# Patient Record
Sex: MALE | Race: WHITE | NOT HISPANIC OR LATINO
[De-identification: names, ages, dates, MRNs, and addresses within clinical notes are randomized per-mention and may not be internally consistent; named-entity substitution may affect disease eponyms.]

---

## 2022-05-04 PROBLEM — Z00.00 ENCOUNTER FOR PREVENTIVE HEALTH EXAMINATION: Status: ACTIVE | Noted: 2022-05-04

## 2022-05-06 ENCOUNTER — APPOINTMENT (OUTPATIENT)
Dept: UROLOGY | Facility: CLINIC | Age: 52
End: 2022-05-06
Payer: COMMERCIAL

## 2022-05-06 DIAGNOSIS — Z80.3 FAMILY HISTORY OF MALIGNANT NEOPLASM OF BREAST: ICD-10-CM

## 2022-05-06 DIAGNOSIS — Z78.9 OTHER SPECIFIED HEALTH STATUS: ICD-10-CM

## 2022-05-06 DIAGNOSIS — Z87.11 PERSONAL HISTORY OF PEPTIC ULCER DISEASE: ICD-10-CM

## 2022-05-06 DIAGNOSIS — E66.9 OBESITY, UNSPECIFIED: ICD-10-CM

## 2022-05-06 DIAGNOSIS — Z87.891 PERSONAL HISTORY OF NICOTINE DEPENDENCE: ICD-10-CM

## 2022-05-06 DIAGNOSIS — E11.9 TYPE 2 DIABETES MELLITUS W/OUT COMPLICATIONS: ICD-10-CM

## 2022-05-06 DIAGNOSIS — Z87.438 PERSONAL HISTORY OF OTHER DISEASES OF MALE GENITAL ORGANS: ICD-10-CM

## 2022-05-06 DIAGNOSIS — E78.5 HYPERLIPIDEMIA, UNSPECIFIED: ICD-10-CM

## 2022-05-06 DIAGNOSIS — Z80.0 FAMILY HISTORY OF MALIGNANT NEOPLASM OF DIGESTIVE ORGANS: ICD-10-CM

## 2022-05-06 DIAGNOSIS — R07.89 OTHER CHEST PAIN: ICD-10-CM

## 2022-05-06 DIAGNOSIS — Z80.7 FAMILY HISTORY OF OTHER MALIGNANT NEOPLASMS OF LYMPHOID, HEMATOPOIETIC AND RELATED TISSUES: ICD-10-CM

## 2022-05-06 LAB
BILIRUB UR QL STRIP: NEGATIVE
CLARITY UR: CLEAR
GLUCOSE UR-MCNC: 500
HCG UR QL: 0.2 EU/DL
HGB UR QL STRIP.AUTO: NEGATIVE
KETONES UR-MCNC: NEGATIVE
LEUKOCYTE ESTERASE UR QL STRIP: NEGATIVE
NITRITE UR QL STRIP: NEGATIVE
PH UR STRIP: 5
PROT UR STRIP-MCNC: NEGATIVE
SP GR UR STRIP: 1.01

## 2022-05-06 PROCEDURE — 81003 URINALYSIS AUTO W/O SCOPE: CPT | Mod: QW

## 2022-05-06 PROCEDURE — 99205 OFFICE O/P NEW HI 60 MIN: CPT | Mod: 25

## 2022-05-06 PROCEDURE — 76857 US EXAM PELVIC LIMITED: CPT

## 2022-05-06 RX ORDER — LOSARTAN POTASSIUM 100 MG/1
TABLET, FILM COATED ORAL
Refills: 0 | Status: ACTIVE | COMMUNITY

## 2022-05-06 RX ORDER — ATORVASTATIN CALCIUM 80 MG/1
TABLET, FILM COATED ORAL
Refills: 0 | Status: ACTIVE | COMMUNITY

## 2022-05-06 RX ORDER — DAPAGLIFLOZIN 10 MG/1
TABLET, FILM COATED ORAL
Refills: 0 | Status: ACTIVE | COMMUNITY

## 2022-05-06 RX ORDER — METFORMIN HYDROCHLORIDE 625 MG/1
TABLET ORAL
Refills: 0 | Status: ACTIVE | COMMUNITY

## 2022-05-06 RX ORDER — ASPIRIN 81 MG
81 TABLET, DELAYED RELEASE (ENTERIC COATED) ORAL
Refills: 0 | Status: ACTIVE | COMMUNITY

## 2022-05-06 NOTE — LETTER BODY
[Dear  ___] : Dear  [unfilled], [Consult Letter:] : I had the pleasure of evaluating your patient, [unfilled]. [Please see my note below.] : Please see my note below. [Consult Closing:] : Thank you very much for allowing me to participate in the care of this patient.  If you have any questions, please do not hesitate to contact me. [Sincerely,] : Sincerely, [FreeTextEntry3] : Ovidio Bailey MD, FACS

## 2022-05-06 NOTE — HISTORY OF PRESENT ILLNESS
[FreeTextEntry1] : Mr. CALEB LITTLEJOHN comes in today for a urologic evaluation.  He presents with 3-4 week onset of left flank pain. This is described as localized, persistent with some variation in intensity up to "5/10".  He denies hematuria or fever.   He had an episode of renal colic one year ago prompting an ER visit which confirmed right ureterolithiasis ("4mm"); the pain resolved although he did not retrieve the stone. \par \par From his general urologic history Mr. Littlejohn reports moderate lower urinary tract symptoms with nocturia x1.\par IPSS: 5/35\par Sono (performed to assess bladder emptying): 21cc PVR\par \par Mr. Littlejohn has been having difficulty achieving erections for several years.  He tried Viagra (? dose) without minimal improvement. Cialis caused a significant headache. \par \par

## 2022-05-06 NOTE — ASSESSMENT
[FreeTextEntry1] : I discussed the findings and options with Mr. CALEB LITTLEJOHN in detail. The location of the pain suggests it is probably musculoskeletal and less likely renal colic.  I have ordered a stone protocol CT to confirm this, especially in view of his prior history of urolithiasis.  I also asked Mr. Littlejohn to send us his prior CT scan for our review.\par \par No intervention is warranted for his minimal voiding symptoms.\par \par Regarding the ED, Mr. Littlejohn will retry Viagra 100-150mg and I reviewed its proper use and counselled him regarding the side effects.  If this is not effective, remaining options include penile injection therapy and a penile prosthesis.  Importantly, he needs to achieve better glucose control, lose weight and begin exercising.\par \par He will consider PSA testing with Dr. Alexander.  The current UA guidelines recommend this at the age of 57. \par \par

## 2022-05-06 NOTE — ADDENDUM
[FreeTextEntry1] : A portion of this note was written by [Pio Barton] on 05/05/2022 acting as a scribe for Dr. Bailey. \par \par I have personally reviewed the chart and agree that the record accurately reflects my personal performance of the history, physical exam, assessment, and plan.

## 2022-05-06 NOTE — PHYSICAL EXAM
[General Appearance - Well Developed] : well developed [General Appearance - Well Nourished] : well nourished [Normal Appearance] : normal appearance [Well Groomed] : well groomed [General Appearance - In No Acute Distress] : no acute distress [Edema] : no peripheral edema [Respiration, Rhythm And Depth] : normal respiratory rhythm and effort [Exaggerated Use Of Accessory Muscles For Inspiration] : no accessory muscle use [Abdomen Soft] : soft [Abdomen Tenderness] : non-tender [Costovertebral Angle Tenderness] : no ~M costovertebral angle tenderness [Urethral Meatus] : meatus normal [Urinary Bladder Findings] : the bladder was normal on palpation [Scrotum] : the scrotum was normal [Testes Mass (___cm)] : there were no testicular masses [No Prostate Nodules] : no prostate nodules [Normal Station and Gait] : the gait and station were normal for the patient's age [] : no rash [No Focal Deficits] : no focal deficits [Oriented To Time, Place, And Person] : oriented to person, place, and time [Affect] : the affect was normal [Mood] : the mood was normal [Not Anxious] : not anxious [No Palpable Adenopathy] : no palpable adenopathy [Heart Rate And Rhythm] : Heart rate and rhythm were normal [Abdomen Mass (___ Cm)] : no abdominal mass palpated [Abdomen Hernia] : no hernia was discovered [Penis Abnormality] : normal circumcised penis [Epididymis] : the epididymides were normal [Testes Tenderness] : no tenderness of the testes [Prostate Tenderness] : the prostate was not tender [Skin Color & Pigmentation] : normal skin color and pigmentation [FreeTextEntry1] : External hemorrhoids

## 2022-05-20 ENCOUNTER — NON-APPOINTMENT (OUTPATIENT)
Age: 52
End: 2022-05-20

## 2023-08-03 ENCOUNTER — OFFICE (OUTPATIENT)
Dept: URBAN - METROPOLITAN AREA CLINIC 29 | Facility: CLINIC | Age: 53
Setting detail: OPHTHALMOLOGY
End: 2023-08-03
Payer: COMMERCIAL

## 2023-08-03 DIAGNOSIS — H52.4: ICD-10-CM

## 2023-08-03 DIAGNOSIS — E11.9: ICD-10-CM

## 2023-08-03 DIAGNOSIS — H43.813: ICD-10-CM

## 2023-08-03 DIAGNOSIS — H35.463: ICD-10-CM

## 2023-08-03 PROCEDURE — 92015 DETERMINE REFRACTIVE STATE: CPT | Performed by: OPHTHALMOLOGY

## 2023-08-03 PROCEDURE — 92201 OPSCPY EXTND RTA DRAW UNI/BI: CPT | Performed by: OPHTHALMOLOGY

## 2023-08-03 PROCEDURE — 92014 COMPRE OPH EXAM EST PT 1/>: CPT | Performed by: OPHTHALMOLOGY

## 2023-08-03 ASSESSMENT — SPHEQUIV_DERIVED
OD_SPHEQUIV: -3.25
OS_SPHEQUIV: -4.375
OD_SPHEQUIV: -3.25

## 2023-08-03 ASSESSMENT — CONFRONTATIONAL VISUAL FIELD TEST (CVF)
OS_FINDINGS: FULL
OD_FINDINGS: FULL

## 2023-08-03 ASSESSMENT — REFRACTION_CURRENTRX
OD_SPHERE: -3.50
OD_OVR_VA: 20/
OD_CYLINDER: SPHERE
OS_CYLINDER: SPHERE
OD_OVR_VA: 20/
OS_ADD: +2.00
OD_ADD: +1.75
OS_OVR_VA: 20/
OD_AXIS: 90
OD_ADD: +2.00
OS_SPHERE: -4.75
OS_ADD: +1.75
OS_OVR_VA: 20/

## 2023-08-03 ASSESSMENT — REFRACTION_AUTOREFRACTION
OD_AXIS: 65
OD_CYLINDER: +1.00
OS_SPHERE: -4.50
OS_CYLINDER: +0.25
OS_AXIS: 150
OD_SPHERE: -3.75

## 2023-08-03 ASSESSMENT — REFRACTION_MANIFEST
OS_ADD: +2.00
OS_VA1: 20/20
OD_SPHERE: -3.75
OS_CYLINDER: SPH
OD_VA1: 20/20
OS_SPHERE: -4.50
OD_CYLINDER: +1.00
OD_ADD: +2.00
OD_AXIS: 65

## 2023-08-03 ASSESSMENT — TONOMETRY
OS_IOP_MMHG: 14
OD_IOP_MMHG: 14

## 2023-08-03 ASSESSMENT — VISUAL ACUITY
OS_BCVA: 20/25-2
OD_BCVA: 20/20-2

## 2023-09-19 ENCOUNTER — APPOINTMENT (OUTPATIENT)
Dept: UROLOGY | Facility: CLINIC | Age: 53
End: 2023-09-19
Payer: COMMERCIAL

## 2023-10-05 PROBLEM — R39.9 LOWER URINARY TRACT SYMPTOMS: Status: ACTIVE | Noted: 2022-05-06

## 2023-10-05 PROBLEM — N52.01 ERECTILE DYSFUNCTION DUE TO ARTERIAL INSUFFICIENCY: Status: ACTIVE | Noted: 2022-05-06

## 2023-10-05 PROBLEM — Z80.52 FAMILY HISTORY OF BLADDER CANCER: Status: ACTIVE | Noted: 2022-05-06

## 2023-10-05 PROBLEM — N20.9 UROLITHIASIS: Status: ACTIVE | Noted: 2022-05-06

## 2023-10-06 ENCOUNTER — APPOINTMENT (OUTPATIENT)
Dept: UROLOGY | Facility: CLINIC | Age: 53
End: 2023-10-06
Payer: COMMERCIAL

## 2023-10-06 VITALS — DIASTOLIC BLOOD PRESSURE: 78 MMHG | OXYGEN SATURATION: 96 % | HEART RATE: 82 BPM | SYSTOLIC BLOOD PRESSURE: 112 MMHG

## 2023-10-06 DIAGNOSIS — R39.9 UNSPECIFIED SYMPTOMS AND SIGNS INVOLVING THE GENITOURINARY SYSTEM: ICD-10-CM

## 2023-10-06 DIAGNOSIS — N52.01 ERECTILE DYSFUNCTION DUE TO ARTERIAL INSUFFICIENCY: ICD-10-CM

## 2023-10-06 DIAGNOSIS — Z80.52 FAMILY HISTORY OF MALIGNANT NEOPLASM OF BLADDER: ICD-10-CM

## 2023-10-06 DIAGNOSIS — N20.9 URINARY CALCULUS, UNSPECIFIED: ICD-10-CM

## 2023-10-06 PROCEDURE — 51798 US URINE CAPACITY MEASURE: CPT

## 2023-10-06 PROCEDURE — 99215 OFFICE O/P EST HI 40 MIN: CPT

## 2023-10-06 PROCEDURE — 76775 US EXAM ABDO BACK WALL LIM: CPT

## 2023-10-06 RX ORDER — TADALAFIL 20 MG/1
20 TABLET ORAL
Qty: 10 | Refills: 11 | Status: ACTIVE | COMMUNITY
Start: 2022-05-06 | End: 1900-01-01

## 2023-10-06 RX ORDER — SILDENAFIL 100 MG/1
100 TABLET, FILM COATED ORAL
Qty: 10 | Refills: 11 | Status: ACTIVE | COMMUNITY
Start: 2022-05-06 | End: 1900-01-01

## 2023-10-08 LAB — PSA SERPL-MCNC: 1.3 NG/ML

## 2023-10-09 ENCOUNTER — NON-APPOINTMENT (OUTPATIENT)
Age: 53
End: 2023-10-09

## 2024-09-16 ENCOUNTER — OFFICE (OUTPATIENT)
Facility: LOCATION | Age: 54
Setting detail: OPHTHALMOLOGY
End: 2024-09-16
Payer: COMMERCIAL

## 2024-09-16 DIAGNOSIS — H35.463: ICD-10-CM

## 2024-09-16 DIAGNOSIS — H43.813: ICD-10-CM

## 2024-09-16 DIAGNOSIS — H52.4: ICD-10-CM

## 2024-09-16 DIAGNOSIS — E11.9: ICD-10-CM

## 2024-09-16 PROCEDURE — 92014 COMPRE OPH EXAM EST PT 1/>: CPT | Performed by: OPHTHALMOLOGY

## 2024-09-16 PROCEDURE — 92015 DETERMINE REFRACTIVE STATE: CPT | Performed by: OPHTHALMOLOGY

## 2024-09-16 PROCEDURE — 92201 OPSCPY EXTND RTA DRAW UNI/BI: CPT | Performed by: OPHTHALMOLOGY

## 2024-09-16 ASSESSMENT — CONFRONTATIONAL VISUAL FIELD TEST (CVF)
OD_FINDINGS: FULL
OS_FINDINGS: FULL

## 2024-10-04 ENCOUNTER — APPOINTMENT (OUTPATIENT)
Dept: UROLOGY | Facility: CLINIC | Age: 54
End: 2024-10-04

## 2024-10-25 ENCOUNTER — APPOINTMENT (OUTPATIENT)
Dept: UROLOGY | Facility: CLINIC | Age: 54
End: 2024-10-25
Payer: COMMERCIAL

## 2024-10-25 DIAGNOSIS — N20.9 URINARY CALCULUS, UNSPECIFIED: ICD-10-CM

## 2024-10-25 DIAGNOSIS — R39.9 UNSPECIFIED SYMPTOMS AND SIGNS INVOLVING THE GENITOURINARY SYSTEM: ICD-10-CM

## 2024-10-25 DIAGNOSIS — N52.01 ERECTILE DYSFUNCTION DUE TO ARTERIAL INSUFFICIENCY: ICD-10-CM

## 2024-10-25 PROCEDURE — 51798 US URINE CAPACITY MEASURE: CPT

## 2024-10-25 PROCEDURE — 76775 US EXAM ABDO BACK WALL LIM: CPT

## 2024-10-25 PROCEDURE — 99215 OFFICE O/P EST HI 40 MIN: CPT

## 2024-10-25 RX ORDER — SEMAGLUTIDE 1.34 MG/ML
2 INJECTION, SOLUTION SUBCUTANEOUS
Refills: 0 | Status: ACTIVE | COMMUNITY

## 2024-10-28 ENCOUNTER — NON-APPOINTMENT (OUTPATIENT)
Age: 54
End: 2024-10-28

## 2024-10-28 LAB — PSA SERPL-MCNC: 1.36 NG/ML

## 2025-07-17 ENCOUNTER — OFFICE (OUTPATIENT)
Dept: URBAN - METROPOLITAN AREA CLINIC 29 | Facility: CLINIC | Age: 55
Setting detail: OPHTHALMOLOGY
End: 2025-07-17
Payer: COMMERCIAL

## 2025-07-17 DIAGNOSIS — H11.32: ICD-10-CM

## 2025-07-17 PROCEDURE — 92012 INTRM OPH EXAM EST PATIENT: CPT | Performed by: OPHTHALMOLOGY

## 2025-07-17 ASSESSMENT — REFRACTION_MANIFEST
OD_VA1: 20/20
OD_SPHERE: -3.50
OS_CYLINDER: SPH
OS_ADD: +2.25
OS_VA1: 20/20
OD_AXIS: 60
OS_SPHERE: -4.50
OD_CYLINDER: +0.50
OD_ADD: +2.25

## 2025-07-17 ASSESSMENT — REFRACTION_AUTOREFRACTION
OD_CYLINDER: +0.50
OS_AXIS: 139
OD_AXIS: 56
OS_CYLINDER: +0.25
OS_SPHERE: -4.50
OD_SPHERE: -3.50

## 2025-07-17 ASSESSMENT — TONOMETRY: OS_IOP_MMHG: 16

## 2025-07-17 ASSESSMENT — REFRACTION_CURRENTRX
OD_SPHERE: -3.50
OS_OVR_VA: 20/
OD_OVR_VA: 20/
OS_CYLINDER: SPH
OS_OVR_VA: 20/
OD_OVR_VA: 20/
OD_AXIS: 67
OS_CYLINDER: SPHERE
OD_SPHERE: -3.50
OD_CYLINDER: SPHERE
OS_OVR_VA: 20/
OD_ADD: +1.75
OD_CYLINDER: +1.00
OD_ADD: +2.00
OS_SPHERE: -4.50
OD_AXIS: 90
OD_OVR_VA: 20/
OS_ADD: +1.75
OS_SPHERE: -4.75
OS_ADD: +2.00

## 2025-07-17 ASSESSMENT — CONFRONTATIONAL VISUAL FIELD TEST (CVF)
OD_FINDINGS: FULL
OS_FINDINGS: FULL

## 2025-07-17 ASSESSMENT — VISUAL ACUITY
OS_BCVA: 20/20-1
OD_BCVA: 20/20-2